# Patient Record
(demographics unavailable — no encounter records)

---

## 2025-03-03 NOTE — PHYSICAL EXAM
[Normocephalic] : normocephalic [Atraumatic] : atraumatic [EOMI] : extra ocular movement intact [PERRL] : pupils equal, round and reactive to light [Sclera nonicteric] : sclera nonicteric [Supple] : supple [No Supraclavicular Adenopathy] : no supraclavicular adenopathy [Examined in the supine and seated position] : examined in the supine and seated position [Symmetrical] : symmetrical [Bra Size: ___] : Bra Size: [unfilled] [No Nipple Retraction] : no left nipple retraction [No Nipple Discharge] : no left nipple discharge [No Axillary Lymphadenopathy] : no left axillary lymphadenopathy [No Edema] : no edema [No Rashes] : no rashes [No Ulceration] : no ulceration [de-identified] : Bilateral breasts and NAC are surgically absent with implants in place [de-identified] : There is a roughly 3cm palpable, firm nodule centrally located (feels like fat necrosis) as well as a less well-defined, firm palpable area laterally along her IMF (feels like scar tissue)  [de-identified] : Two Small palpable nodule just lateral to midline near the T junction and along the IMF laterally (feels like fat necrosis)

## 2025-03-03 NOTE — ASSESSMENT
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 Patient underwent bilateral mastectomy with left SLNB with expanders in June 2022. She completed adjuvant chemo in October 2023 and has been on Endocrine therapy since.   CBE is stable from previous exam. Follow up in 6 months for next CBE.   Follow up with medical oncology and plastics dr gomez   Patient verbalized understanding for all treatment plans discussed. All of her questions were answered to the best of my ability. She was encouraged to call the office if any questions or concerns or come in sooner if needed.  Plan: 1. Follow up 6 months 2. Follow up with med onc/ plastics dr strickland

## 2025-03-03 NOTE — ASSESSMENT
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 Patient underwent bilateral mastectomy with left SLNB with expanders in June 2022. She completed adjuvant chemo in October 2023 and has been on Endocrine therapy since.   CBE is stable from previous exam. Follow up in 6 months for next CBE.   Follow up with medical oncology and plastics dr gomez   Patient verbalized understanding for all treatment plans discussed. All of her questions were answered to the best of my ability. She was encouraged to call the office if any questions or concerns or come in sooner if needed.  Plan: 1. Follow up 6 months 2. Follow up with med onc/ plastics dr strickland

## 2025-03-03 NOTE — HISTORY OF PRESENT ILLNESS
[FreeTextEntry1] : Oncology List:  1. Left breast 3:00 pT2N0 IDC, grade 3, ER 90%, ME 60%, Her 2 equivocal ERVIN negative      -Sari genetic testing negative for pathogenic mutation     -Bilateral mastectomy with left SLNB with expanders 22          FINAL PATH: 2.1 IDC grade 3 with DCIS and lympho vascular invasion. ER 90% ME 60% Her 2 negative and 0/2 lymph nodes negative for metastatic carcinoma      -Adjuvant chemo completed 10/23     -Started on Arimdex     -Planning for reconstruction revision with Dr. Brown over the summer  [facility]  [facility] Plastics-Oscar / Stephanie   Interval History:  (24): Patient presents to establish care. She has not seen Nivia since 2022. She reports some palpable firm nodules in bilateral breasts for which she was told was probably scar tissue. She is on Anastrozole tolerating well. She has no plans for further revision at this time.   (23): Patient presents for follow up. On anstrozole experiencing joint pain but tolerating well. Interested in reconstruction revision surgery but would like to see a different plastic surgeon.   (25): Patient presents for 6 month follow up. On Anastrozole, still experiencing joint pain, but tolerable. Will be doing reconstruction revision over the summer. No breast complaints. She still feels the previously palpable areas of fat necrosis, otherwise no issues.     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 Nadege was diagnosed with left breast IDC at age 57.  She underwent screening mammogram (last from ) demonstrating a 15 mm nodule and was sent for targeted imaging.  Targeted imaging recommended biopsy and biopsy performed on 3/24/2022 demonstrated IDC.    Imagin24: [facility]: Breast Bilateral Ultrasound Complete: Impression - A 1.8 cm mass at right 12:00 subareolar region and a 0.9 cm mass at left 3:00 15 cm FN correspond to palpable areas of concern and likely represent evolving fat necrosis. Recommend short interval sonographic follow up to evaluate for stability/resolution due 2024. Multiple additional palpable areas of concern in the bilateral breasts correspond to oil cysts. There are considered benign and no additional dedicated follow up is required. BI-RADS 3   24: [facility]: Breast Bilateral Ultrasound Complete: Impression - Multiple areas of fat necrosis/oil cysts in both breasts, stable or diminished compared with 3/7/2024 study. Bilateral breast implants. No suspicious findings. No further dedicated imaging follow up needed. BI-RADS 2

## 2025-03-03 NOTE — PHYSICAL EXAM
[Normocephalic] : normocephalic [Atraumatic] : atraumatic [EOMI] : extra ocular movement intact [PERRL] : pupils equal, round and reactive to light [Sclera nonicteric] : sclera nonicteric [Supple] : supple [No Supraclavicular Adenopathy] : no supraclavicular adenopathy [Examined in the supine and seated position] : examined in the supine and seated position [Symmetrical] : symmetrical [Bra Size: ___] : Bra Size: [unfilled] [No Nipple Retraction] : no left nipple retraction [No Nipple Discharge] : no left nipple discharge [No Axillary Lymphadenopathy] : no left axillary lymphadenopathy [No Edema] : no edema [No Rashes] : no rashes [No Ulceration] : no ulceration [de-identified] : Bilateral breasts and NAC are surgically absent with implants in place [de-identified] : There is a roughly 3cm palpable, firm nodule centrally located (feels like fat necrosis) as well as a less well-defined, firm palpable area laterally along her IMF (feels like scar tissue)  [de-identified] : Two Small palpable nodule just lateral to midline near the T junction and along the IMF laterally (feels like fat necrosis)

## 2025-03-03 NOTE — HISTORY OF PRESENT ILLNESS
[FreeTextEntry1] : Oncology List:  1. Left breast 3:00 pT2N0 IDC, grade 3, ER 90%, [place] 60%, Her 2 equivocal ERVIN negative      -Sari genetic testing negative for pathogenic mutation     -Bilateral mastectomy with left SLNB with expanders 22          FINAL PATH: 2.1 IDC grade 3 with DCIS and lympho vascular invasion. ER 90% AK 60% Her 2 negative and 0/2 lymph nodes negative for metastatic carcinoma      -Adjuvant chemo completed 10/23     -Started on Arimdex     -Planning for reconstruction revision with Dr. Brown over the summer  [facility]  [facility] Plastics-Oscar / Stephanie   Interval History:  (24): Patient presents to establish care. She has not seen Nivia since 2022. She reports some palpable firm nodules in bilateral breasts for which she was told was probably scar tissue. She is on Anastrozole tolerating well. She has no plans for further revision at this time.   (23): Patient presents for follow up. On anstrozole experiencing joint pain but tolerating well. Interested in reconstruction revision surgery but would like to see a different plastic surgeon.   (25): Patient presents for 6 month follow up. On Anastrozole, still experiencing joint pain, but tolerable. Will be doing reconstruction revision over the summer. No breast complaints. She still feels the previously palpable areas of fat necrosis, otherwise no issues.     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 Nadege was diagnosed with left breast IDC at age 57.  She underwent screening mammogram (last from ) demonstrating a 15 mm nodule and was sent for targeted imaging.  Targeted imaging recommended biopsy and biopsy performed on 3/24/2022 demonstrated IDC.    Imagin24: [facility]: Breast Bilateral Ultrasound Complete: Impression - A 1.8 cm mass at right 12:00 subareolar region and a 0.9 cm mass at left 3:00 15 cm FN correspond to palpable areas of concern and likely represent evolving fat necrosis. Recommend short interval sonographic follow up to evaluate for stability/resolution due 2024. Multiple additional palpable areas of concern in the bilateral breasts correspond to oil cysts. There are considered benign and no additional dedicated follow up is required. BI-RADS 3   24: [facility]: Breast Bilateral Ultrasound Complete: Impression - Multiple areas of fat necrosis/oil cysts in both breasts, stable or diminished compared with 3/7/2024 study. Bilateral breast implants. No suspicious findings. No further dedicated imaging follow up needed. BI-RADS 2

## 2025-06-04 NOTE — ASSESSMENT
[FreeTextEntry1] : Patient is a 60 year old female with history of Left IDC, grade 3, ER 90%, [place] 60%, Her 2 negative. Patient underwent bilateral mastectomy with left SLNB with expanders in June 2022. She completed adjuvant chemo in October 2023 and has been on Endocrine therapy since.   CBE is stable from previous exam. Follow up in 6 months for next CBE. We discussed different padding/dressings she can try to help with the rubbing of her bra at the [facility].   Follow up with medical oncology and plastics dr gomez   Patient verbalized understanding for all treatment plans discussed. All of her questions were answered to the best of my ability. She was encouraged to call the office if any questions or concerns or come in sooner if needed.  Plan: 1. Follow up 6 months 2. Follow up with med onc/ plastics dr strickland

## 2025-06-04 NOTE — ASSESSMENT
[FreeTextEntry1] : Patient is a 60 year old female with history of Left IDC, grade 3, ER 90%, [place] 60%, Her 2 negative. Patient underwent bilateral mastectomy with left SLNB with expanders in June 2022. She completed adjuvant chemo in October 2023 and has been on Endocrine therapy since.   CBE is stable from previous exam. Follow up in 6 months for next CBE. We discussed different padding/dressings she can try to help with the rubbing of her bra at the [facility].   Follow up with medical oncology and plastics dr gomez   Patient verbalized understanding for all treatment plans discussed. All of her questions were answered to the best of my ability. She was encouraged to call the office if any questions or concerns or come in sooner if needed.  Plan: 1. Follow up 6 months 2. Follow up with med onc/ plastics dr strickland

## 2025-06-04 NOTE — PHYSICAL EXAM
[Normocephalic] : normocephalic [Atraumatic] : atraumatic [EOMI] : extra ocular movement intact [PERRL] : pupils equal, round and reactive to light [Sclera nonicteric] : sclera nonicteric [Supple] : supple [No Supraclavicular Adenopathy] : no supraclavicular adenopathy [Examined in the supine and seated position] : examined in the supine and seated position [Symmetrical] : symmetrical [Bra Size: ___] : Bra Size: [unfilled] [No Nipple Retraction] : no left nipple retraction [No Nipple Discharge] : no left nipple discharge [No Axillary Lymphadenopathy] : no left axillary lymphadenopathy [No Edema] : no edema [No Rashes] : no rashes [No Ulceration] : no ulceration [de-identified] : Bilateral breasts and NAC are surgically absent with implants in place [de-identified] : There is a roughly 3cm palpable, firm nodule centrally located (feels like fat necrosis) as well as a less well-defined, firm palpable area laterally along her IMF (feels like scar tissue)  [de-identified] : Two Small palpable nodule just lateral to midline near the T junction and along the IMF laterally (feels like fat necrosis)

## 2025-06-04 NOTE — PHYSICAL EXAM
[Normocephalic] : normocephalic [Atraumatic] : atraumatic [EOMI] : extra ocular movement intact [PERRL] : pupils equal, round and reactive to light [Sclera nonicteric] : sclera nonicteric [Supple] : supple [No Supraclavicular Adenopathy] : no supraclavicular adenopathy [Examined in the supine and seated position] : examined in the supine and seated position [Symmetrical] : symmetrical [Bra Size: ___] : Bra Size: [unfilled] [No Nipple Retraction] : no left nipple retraction [No Nipple Discharge] : no left nipple discharge [No Axillary Lymphadenopathy] : no left axillary lymphadenopathy [No Edema] : no edema [No Rashes] : no rashes [No Ulceration] : no ulceration [de-identified] : Bilateral breasts and NAC are surgically absent with implants in place [de-identified] : There is a roughly 3cm palpable, firm nodule centrally located (feels like fat necrosis) as well as a less well-defined, firm palpable area laterally along her IMF (feels like scar tissue)  [de-identified] : Two Small palpable nodule just lateral to midline near the T junction and along the IMF laterally (feels like fat necrosis)

## 2025-06-04 NOTE — HISTORY OF PRESENT ILLNESS
[FreeTextEntry1] : Oncology List:  1. Left breast 3:00 pT2N0 IDC, grade 3, ER 90%, [place] 60%, Her 2 equivocal ERVIN negative      -Sari genetic testing negative for pathogenic mutation     -Bilateral mastectomy with left SLNB with expanders 22          FINAL PATH: 2.1 IDC grade 3 with DCIS and lympho vascular invasion. ER 90% NJ 60% Her 2 negative and 0/2 lymph nodes negative for metastatic carcinoma      -Adjuvant chemo completed 10/23     -Started on Arimdex     -Planning for reconstruction revision with Dr. Brown over the summer  [facility]  [facility] Plastics-Oscar / Stephanie   Interval History:  (24): Patient presents to establish care. She has not seen Nivia since 2022. She reports some palpable firm nodules in bilateral breasts for which she was told was probably scar tissue. She is on Anastrozole tolerating well. She has no plans for further revision at this time.   (23): Patient presents for follow up. On anstrozole experiencing joint pain but tolerating well. Interested in reconstruction revision surgery but would like to see a different plastic surgeon.   (25): Patient presents for 6 month follow up. On Anastrozole, still experiencing joint pain, but tolerable. Will be doing reconstruction revision over the summer. No breast complaints. She still feels the previously palpable areas of fat necrosis, otherwise no issues.    (25): Patient presents for follow up prior to her reconstruction revision. She feels a pulling sensation on the right lateral breast when she doesn't wear a supportive bra, however, she feels irritation on the left side from her bra when she wears it too much.    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 Nadege was diagnosed with left breast IDC at age 57.  She underwent screening mammogram (last from 2018) demonstrating a 15 mm nodule and was sent for targeted imaging.  Targeted imaging recommended biopsy and biopsy performed on 3/24/2022 demonstrated IDC.    Imagin24: [facility]: Breast Bilateral Ultrasound Complete: Impression - A 1.8 cm mass at right 12:00 subareolar region and a 0.9 cm mass at left 3:00 15 cm FN correspond to palpable areas of concern and likely represent evolving fat necrosis. Recommend short interval sonographic follow up to evaluate for stability/resolution due 2024. Multiple additional palpable areas of concern in the bilateral breasts correspond to oil cysts. There are considered benign and no additional dedicated follow up is required. BI-RADS 3   24: [facility]: Breast Bilateral Ultrasound Complete: Impression - Multiple areas of fat necrosis/oil cysts in both breasts, stable or diminished compared with 3/7/2024 study. Bilateral breast implants. No suspicious findings. No further dedicated imaging follow up needed. BI-RADS 2

## 2025-06-04 NOTE — HISTORY OF PRESENT ILLNESS
[FreeTextEntry1] : Oncology List:  1. Left breast 3:00 pT2N0 IDC, grade 3, ER 90%, [place] 60%, Her 2 equivocal ERVIN negative      -Sari genetic testing negative for pathogenic mutation     -Bilateral mastectomy with left SLNB with expanders 22          FINAL PATH: 2.1 IDC grade 3 with DCIS and lympho vascular invasion. ER 90% CA 60% Her 2 negative and 0/2 lymph nodes negative for metastatic carcinoma      -Adjuvant chemo completed 10/23     -Started on Arimdex     -Planning for reconstruction revision with Dr. Brown over the summer  Care Team[facility]  [facility] Plastics-Oscar / Stephanie   Interval History:  (24): Patient presents to establish care. She has not seen Nivia since 2022. She reports some palpable firm nodules in bilateral breasts for which she was told was probably scar tissue. She is on Anastrozole tolerating well. She has no plans for further revision at this time.   (23): Patient presents for follow up. On anstrozole experiencing joint pain but tolerating well. Interested in reconstruction revision surgery but would like to see a different plastic surgeon.   (25): Patient presents for 6 month follow up. On Anastrozole, still experiencing joint pain, but tolerable. Will be doing reconstruction revision over the summer. No breast complaints. She still feels the previously palpable areas of fat necrosis, otherwise no issues.    (25): Patient presents for follow up prior to her reconstruction revision. She feels a pulling sensation on the right lateral breast when she doesn't wear a supportive bra, however, she feels irritation on the left side from her bra when she wears it too much.    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 Nadege was diagnosed with left breast IDC at age 57.  She underwent screening mammogram (last from 2018) demonstrating a 15 mm nodule and was sent for targeted imaging.  Targeted imaging recommended biopsy and biopsy performed on 3/24/2022 demonstrated IDC.    Imagin24: [facility]: Breast Bilateral Ultrasound Complete: Impression - A 1.8 cm mass at right 12:00 subareolar region and a 0.9 cm mass at left 3:00 15 cm FN correspond to palpable areas of concern and likely represent evolving fat necrosis. Recommend short interval sonographic follow up to evaluate for stability/resolution due 2024. Multiple additional palpable areas of concern in the bilateral breasts correspond to oil cysts. There are considered benign and no additional dedicated follow up is required. BI-RADS 3   24: [facility]: Breast Bilateral Ultrasound Complete: Impression - Multiple areas of fat necrosis/oil cysts in both breasts, stable or diminished compared with 3/7/2024 study. Bilateral breast implants. No suspicious findings. No further dedicated imaging follow up needed. BI-RADS 2

## 2025-06-11 NOTE — PHYSICAL EXAM
[de-identified] : NL respiratory effort noted  [de-identified] : Bilateral reconstructed breasts examined. There is bilateral hyperanimation, right greater than left, with significant tethering and creasing of the skin. There is excess skin and soft tissue at the apex of the vertical incision. On the left, there is mild to moderate animation and a LOQ mass, about 1 cm. [de-identified] : No breasts, s/p bilateral mastectomy.  [de-identified] : Excess adipose. Faded scars noted.

## 2025-06-11 NOTE — HISTORY OF PRESENT ILLNESS
[FreeTextEntry1] : Pt reports no new problems. She is here to review the plan for revision of the right breast reconstruction and excision of the left reconstructed breast mass.

## 2025-06-11 NOTE — ASSESSMENT
[FreeTextEntry1] : As above, pt will need revision of the right breast reconstruction including implant exchange, capsulorrhaphy and mesh placement and excision of excess skin and central mass (88873-QI, [id number]), and excision of left reconstructed breast mass in the lower pole ([id number]). During this visit, we reviewed the possibility of site change from under the muscle to over the muscle. She previously was not interested in this, but now she would like to proceed as this will stop the creasing of her skin and will be able to limit the lateralization of the implant. This may or may not obviate the need for the mesh. We discussed fat grafting, but pt does not want to take any chances of having new areas of fat necrosis or any mass that will need to be tested or biopsied. She reports she is not bothered by the animation on the left and does not feel the need to address it. We reviewed the R/B/A of silicone implants briefly. We reviewed in detail the risks of infection of the new implant, and that infection could lead to a washout and replacement, possible long-term IV antibiotics and even removal of the implant completely to control the infection. She reported her understanding. She would like to proceed at some point when school is out for recess or during the summer as she takes care of her granddaughter and does not want to disrupt her schedule. Her surgery is scheduled for August. We discussed presurgical testing and medical clearance. I would like to have her hem-onc note from April, but she has not had any issues with anemia for the last two years, so we do not need a separate clearance letter from them.